# Patient Record
Sex: MALE | Race: WHITE | NOT HISPANIC OR LATINO | Employment: FULL TIME | URBAN - NONMETROPOLITAN AREA
[De-identification: names, ages, dates, MRNs, and addresses within clinical notes are randomized per-mention and may not be internally consistent; named-entity substitution may affect disease eponyms.]

---

## 2024-04-22 ENCOUNTER — OFFICE VISIT (OUTPATIENT)
Dept: FAMILY MEDICINE CLINIC | Facility: CLINIC | Age: 49
End: 2024-04-22
Payer: COMMERCIAL

## 2024-04-22 VITALS
HEART RATE: 84 BPM | TEMPERATURE: 97.8 F | OXYGEN SATURATION: 94 % | HEIGHT: 71 IN | WEIGHT: 220 LBS | BODY MASS INDEX: 30.8 KG/M2 | DIASTOLIC BLOOD PRESSURE: 72 MMHG | SYSTOLIC BLOOD PRESSURE: 112 MMHG

## 2024-04-22 DIAGNOSIS — A04.5 CAMPYLOBACTER ENTERITIS: Primary | ICD-10-CM

## 2024-04-22 PROCEDURE — 99204 OFFICE O/P NEW MOD 45 MIN: CPT | Performed by: PHYSICIAN ASSISTANT

## 2024-04-22 RX ORDER — AZITHROMYCIN 250 MG/1
TABLET, FILM COATED ORAL
Qty: 6 TABLET | Refills: 0 | Status: SHIPPED | OUTPATIENT
Start: 2024-04-22

## 2024-04-22 RX ORDER — MULTIPLE VITAMINS W/ MINERALS TAB 9MG-400MCG
1 TAB ORAL DAILY
COMMUNITY

## 2024-04-22 NOTE — PROGRESS NOTES
Follow Up Office Visit      Date: 2024   Patient Name: Malcolm Tellez  : 1975   MRN: [id number]     Chief Complaint:    Chief Complaint   Patient presents with    Hospital Follow Up Visit     Food poisoning       History of Present Illness: Malcolm Tellez is a 49 y.o. male who is here today for follow-up of an emergency room visit.  He reports that he developed sudden onset of abdominal cramping and diarrhea associated with general malaise this was seen in the emergency room last Tuesday.   After extensive labs he was diagnosed with  Campylobacter enteritis he was discharged home after 2 L of fluids but without antibiotics. At the 5 day francine  he continued to have abdominal pain and diarrhea now associated with shaking chills.  He reports that he has not been able to work the entire week due to illness and feeling of generalized malaise as well as dizziness.  He denies bloody stools but has had frequent attacks of abdominal cramping after eating.  His laboratory date shows some mildly increased liver enzymes but negative Hepatitis Panel.  His white count was Ten thousand.    Subjective      Review of Systems:   Review of Systems   Constitutional:  Positive for activity change, appetite change, chills, diaphoresis, fatigue, fever and unexpected weight loss.   Respiratory: Negative.     Cardiovascular: Negative.    Gastrointestinal:  Positive for diarrhea, nausea and indigestion. Negative for blood in stool and vomiting.   Genitourinary: Negative.        I have reviewed the patients family history, social history, past medical history, past surgical history and have updated it as appropriate.     Medications:     Current Outpatient Medications:     multivitamin with minerals tablet tablet, Take 1 tablet by mouth Daily., Disp: , Rfl:     azithromycin (Zithromax Z-Yair) 250 MG tablet, Take 2 tablets by mouth on day 1, then 1 tablet daily on days 2-5, Disp: 6 tablet, Rfl: 0    Allergies:   No Known  "Allergies    Objective     Physical Exam: Please see above  Vital Signs:   Vitals:    04/22/24 1123   BP: 112/72   Pulse: 84   Temp: 97.8 °F (36.6 °C)   SpO2: 94%   Weight: 99.8 kg (220 lb)   Height: 180 cm (70.87\")     Body mass index is 30.8 kg/m².  Facility age limit for growth %joanie is 20 years.  BMI is >= 30 and <35. (Class 1 Obesity). The following options were offered after discussion;: information on healthy weight added to patient's after visit summary        Physical Exam  Vitals and nursing note reviewed.   Constitutional:       General: He is not in acute distress.     Appearance: Normal appearance. He is not toxic-appearing or diaphoretic.   Eyes:      Extraocular Movements: Extraocular movements intact.      Pupils: Pupils are equal, round, and reactive to light.   Cardiovascular:      Rate and Rhythm: Normal rate and regular rhythm.      Heart sounds: No murmur heard.     No friction rub. No gallop.   Abdominal:      General: There is no distension.      Palpations: Abdomen is soft.      Comments: Multiple episodes of audible abdominal cramping   Neurological:      Mental Status: He is alert.       Procedures    Assessment / Plan      Assessment/Plan:   1. Campylobacter enteritis  We will treat and follow closely.  If he has fever over 100 or other episodes of shaking chills he should contact us.  RTC in 48 hours for reevalaution and to recheck liver enzymes.  - azithromycin (Zithromax Z-Yair) 250 MG tablet; Take 2 tablets by mouth on day 1, then 1 tablet daily on days 2-5  Dispense: 6 tablet; Refill: 0      Follow Up:   No follow-ups on file.      At [facility], we believe that sharing information builds trust and better relationships. You are receiving this note because you recently visited [facility]. It is possible you will see health information before a provider has talked with you about it. This kind of information can be easy to misunderstand. To help you fully understand what it means " for your health, we urge you to discuss this note with your provider.    Rere Burroughs PA-C  [facility]

## 2024-04-24 ENCOUNTER — LAB (OUTPATIENT)
Dept: FAMILY MEDICINE CLINIC | Facility: CLINIC | Age: 49
End: 2024-04-24
Payer: COMMERCIAL

## 2024-04-24 ENCOUNTER — OFFICE VISIT (OUTPATIENT)
Dept: FAMILY MEDICINE CLINIC | Facility: CLINIC | Age: 49
End: 2024-04-24
Payer: COMMERCIAL

## 2024-04-24 VITALS
WEIGHT: 225.6 LBS | BODY MASS INDEX: 31.58 KG/M2 | TEMPERATURE: 97.5 F | HEART RATE: 82 BPM | HEIGHT: 71 IN | RESPIRATION RATE: 16 BRPM | OXYGEN SATURATION: 98 % | SYSTOLIC BLOOD PRESSURE: 102 MMHG | DIASTOLIC BLOOD PRESSURE: 74 MMHG

## 2024-04-24 DIAGNOSIS — R06.83 LOUD SNORING: ICD-10-CM

## 2024-04-24 DIAGNOSIS — Z00.00 ANNUAL PHYSICAL EXAM: ICD-10-CM

## 2024-04-24 DIAGNOSIS — R74.8 ELEVATED LIVER ENZYMES: ICD-10-CM

## 2024-04-24 DIAGNOSIS — K52.9 ENTERITIS: Primary | ICD-10-CM

## 2024-04-24 LAB
ALBUMIN SERPL-MCNC: 3.3 G/DL (ref 3.5–5.2)
ALBUMIN/GLOB SERPL: 1.2 G/DL
ALP SERPL-CCNC: 87 U/L (ref 39–117)
ALT SERPL W P-5'-P-CCNC: 99 U/L (ref 1–41)
ANION GAP SERPL CALCULATED.3IONS-SCNC: 8.8 MMOL/L (ref 5–15)
AST SERPL-CCNC: 56 U/L (ref 1–40)
BILIRUB SERPL-MCNC: 0.2 MG/DL (ref 0–1.2)
BUN SERPL-MCNC: 11 MG/DL (ref 6–20)
BUN/CREAT SERPL: 10.1 (ref 7–25)
CALCIUM SPEC-SCNC: 8.5 MG/DL (ref 8.6–10.5)
CHLORIDE SERPL-SCNC: 105 MMOL/L (ref 98–107)
CHOLEST SERPL-MCNC: 143 MG/DL (ref 0–200)
CO2 SERPL-SCNC: 25.2 MMOL/L (ref 22–29)
CREAT SERPL-MCNC: 1.09 MG/DL (ref 0.76–1.27)
DEPRECATED RDW RBC AUTO: 39.5 FL (ref 37–54)
EGFRCR SERPLBLD CKD-EPI 2021: 84.2 ML/MIN/1.73
ERYTHROCYTE [DISTWIDTH] IN BLOOD BY AUTOMATED COUNT: 12.5 % (ref 12.3–15.4)
GLOBULIN UR ELPH-MCNC: 2.7 GM/DL
GLUCOSE SERPL-MCNC: 88 MG/DL (ref 65–99)
HCT VFR BLD AUTO: 43.1 % (ref 37.5–51)
HDLC SERPL-MCNC: 18 MG/DL (ref 40–60)
HGB BLD-MCNC: 14.7 G/DL (ref 13–17.7)
LDLC SERPL CALC-MCNC: 93 MG/DL (ref 0–100)
LDLC/HDLC SERPL: 4.92 {RATIO}
MCH RBC QN AUTO: 29.8 PG (ref 26.6–33)
MCHC RBC AUTO-ENTMCNC: 34.1 G/DL (ref 31.5–35.7)
MCV RBC AUTO: 87.4 FL (ref 79–97)
PLATELET # BLD AUTO: 284 10*3/MM3 (ref 140–450)
PMV BLD AUTO: 9.9 FL (ref 6–12)
POTASSIUM SERPL-SCNC: 4.4 MMOL/L (ref 3.5–5.2)
PROT SERPL-MCNC: 6 G/DL (ref 6–8.5)
RBC # BLD AUTO: 4.93 10*6/MM3 (ref 4.14–5.8)
SODIUM SERPL-SCNC: 139 MMOL/L (ref 136–145)
TESTOST SERPL-MCNC: 770 NG/DL (ref 249–836)
TRIGL SERPL-MCNC: 182 MG/DL (ref 0–150)
TSH SERPL DL<=0.05 MIU/L-ACNC: 1.51 UIU/ML (ref 0.27–4.2)
VLDLC SERPL-MCNC: 32 MG/DL (ref 5–40)
WBC NRBC COR # BLD AUTO: 4.99 10*3/MM3 (ref 3.4–10.8)

## 2024-04-24 PROCEDURE — 36415 COLL VENOUS BLD VENIPUNCTURE: CPT | Performed by: FAMILY MEDICINE

## 2024-04-24 PROCEDURE — 86803 HEPATITIS C AB TEST: CPT | Performed by: PHYSICIAN ASSISTANT

## 2024-04-24 PROCEDURE — 84403 ASSAY OF TOTAL TESTOSTERONE: CPT | Performed by: PHYSICIAN ASSISTANT

## 2024-04-24 PROCEDURE — 83036 HEMOGLOBIN GLYCOSYLATED A1C: CPT | Performed by: PHYSICIAN ASSISTANT

## 2024-04-24 PROCEDURE — 80050 GENERAL HEALTH PANEL: CPT | Performed by: PHYSICIAN ASSISTANT

## 2024-04-24 PROCEDURE — 99214 OFFICE O/P EST MOD 30 MIN: CPT | Performed by: PHYSICIAN ASSISTANT

## 2024-04-24 PROCEDURE — 80061 LIPID PANEL: CPT | Performed by: PHYSICIAN ASSISTANT

## 2024-04-24 PROCEDURE — 85007 BL SMEAR W/DIFF WBC COUNT: CPT | Performed by: PHYSICIAN ASSISTANT

## 2024-04-24 NOTE — PROGRESS NOTES
"    Follow Up Office Visit      Date: 2024   Patient Name: Malcolm Tellez  : 1975   MRN: [id number]     Chief Complaint:    Chief Complaint   Patient presents with    Follow-up     2 day.       History of Present Illness: Malcolm Tellez is a 49 y.o. male who is here today for 48 hour follow up after 10 days of GI distress diagnosed a camphylobacter enteritis.we elected to begin zithromax 48 hours ago and he is now beginning to feel improvement.  His gut is tolerating food without cramping and his energy level is improving.  He has been pushing Gatorade as advised.  While he is here and we are repeating his liver enzymes that were mildly elevated he would like to pursue checking his testosterone level .  He has noticed decreased sex drive, generalized lack of energy and muscle wasting.  He admits to snoring and frequently waking from sleep throughout the night.  His wife frequently states his snoring is loudly and waking unrefreshed.    Subjective      Review of Systems:   Review of Systems   Constitutional: Negative.    HENT: Negative.     Respiratory: Negative.     Cardiovascular: Negative.    Psychiatric/Behavioral: Negative.       I have reviewed the patients family history, social history, past medical history, past surgical history and have updated it as appropriate.     Medications:     Current Outpatient Medications:     azithromycin (Zithromax Z-Yair) 250 MG tablet, Take 2 tablets by mouth on day 1, then 1 tablet daily on days 2-5, Disp: 6 tablet, Rfl: 0    multivitamin with minerals tablet tablet, Take 1 tablet by mouth Daily., Disp: , Rfl:     Allergies:   No Known Allergies    Objective     Physical Exam: Please see above  Vital Signs:   Vitals:    24 1052   BP: 102/74   BP Location: Left arm   Patient Position: Sitting   Cuff Size: Adult   Pulse: 82   Resp: 16   Temp: 97.5 °F (36.4 °C)   TempSrc: Temporal   SpO2: 98%   Weight: 102 kg (225 lb 9.6 oz)   Height: 180.3 cm (71\")     Body mass " index is 31.46 kg/m².  Facility age limit for growth %joanie is 20 years.          Physical Exam  Vitals and nursing note reviewed.   Constitutional:       General: He is not in acute distress.     Appearance: Normal appearance. He is not ill-appearing or toxic-appearing.   HENT:      Mouth/Throat:      Mouth: Mucous membranes are moist.   Eyes:      Extraocular Movements: Extraocular movements intact.      Pupils: Pupils are equal, round, and reactive to light.   Cardiovascular:      Rate and Rhythm: Normal rate and regular rhythm.      Heart sounds: No murmur heard.     No friction rub. No gallop.   Pulmonary:      Effort: Pulmonary effort is normal. No respiratory distress.      Breath sounds: Normal breath sounds. No wheezing or rales.   Neurological:      General: No focal deficit present.      Mental Status: He is alert and oriented to person, place, and time. Mental status is at baseline.      Cranial Nerves: No cranial nerve deficit.      Motor: No weakness.      Gait: Gait normal.   Psychiatric:         Mood and Affect: Mood normal.         Behavior: Behavior normal.         Thought Content: Thought content normal.         Judgment: Judgment normal.     Procedures    Assessment / Plan      Assessment/Plan:   1. Enteritis  Resolving, he will push fluids, slowly reintroduce food and begin probiotic    2. Elevated liver enzymes  We will repeat today and follow.    3. Loud snoring  We have discussed the long term issues including cardiovascular disease associated with snoring.  We will refer to sleep medicine  - Testosterone; Future  - Ambulatory Referral to Sleep Medicine    4. Annual physical exam    - Comprehensive Metabolic Panel; Future  - CBC With Manual Differential; Future  - Hemoglobin A1c; Future  - TSH Rfx On Abnormal To Free T4; Future  - Hepatitis C Antibody; Future  - Lipid Panel; Future      Follow Up:   No follow-ups on file.      At [facility], we believe that sharing information builds trust  and better relationships. You are receiving this note because you recently visited [facility]. It is possible you will see health information before a provider has talked with you about it. This kind of information can be easy to misunderstand. To help you fully understand what it means for your health, we urge you to discuss this note with your provider.    Rere Burroughs PA-C  [facility]

## 2024-04-25 LAB
ANISOCYTOSIS BLD QL: NORMAL
BASOPHILS # BLD MANUAL: 0.05 10*3/MM3 (ref 0–0.2)
BASOPHILS NFR BLD MANUAL: 1 % (ref 0–1.5)
EOSINOPHIL # BLD MANUAL: 0.05 10*3/MM3 (ref 0–0.4)
EOSINOPHIL NFR BLD MANUAL: 1 % (ref 0.3–6.2)
HBA1C MFR BLD: 5.5 % (ref 4.8–5.6)
HCV AB SER QL: NORMAL
LYMPHOCYTES # BLD MANUAL: 1.55 10*3/MM3 (ref 0.7–3.1)
LYMPHOCYTES NFR BLD MANUAL: 11 % (ref 5–12)
MONOCYTES # BLD: 0.55 10*3/MM3 (ref 0.1–0.9)
NEUTROPHILS # BLD AUTO: 2.79 10*3/MM3 (ref 1.7–7)
NEUTROPHILS NFR BLD MANUAL: 56 % (ref 42.7–76)
PLAT MORPH BLD: NORMAL
POIKILOCYTOSIS BLD QL SMEAR: NORMAL
VARIANT LYMPHS NFR BLD MANUAL: 27 % (ref 19.6–45.3)
VARIANT LYMPHS NFR BLD MANUAL: 4 % (ref 0–5)
WBC MORPH BLD: NORMAL

## 2025-03-11 ENCOUNTER — OFFICE VISIT (OUTPATIENT)
Dept: FAMILY MEDICINE CLINIC | Facility: CLINIC | Age: 50
End: 2025-03-11
Payer: COMMERCIAL

## 2025-03-11 VITALS
BODY MASS INDEX: 31.22 KG/M2 | DIASTOLIC BLOOD PRESSURE: 76 MMHG | HEIGHT: 71 IN | SYSTOLIC BLOOD PRESSURE: 118 MMHG | WEIGHT: 223 LBS | TEMPERATURE: 98 F | OXYGEN SATURATION: 97 % | HEART RATE: 68 BPM | RESPIRATION RATE: 18 BRPM

## 2025-03-11 DIAGNOSIS — R74.8 ELEVATED LIVER ENZYMES: ICD-10-CM

## 2025-03-11 DIAGNOSIS — Z12.11 COLON CANCER SCREENING: ICD-10-CM

## 2025-03-11 DIAGNOSIS — M54.50 ACUTE BILATERAL LOW BACK PAIN WITHOUT SCIATICA: ICD-10-CM

## 2025-03-11 DIAGNOSIS — Z12.5 PROSTATE CANCER SCREENING: ICD-10-CM

## 2025-03-11 DIAGNOSIS — F17.210 CIGARETTE NICOTINE DEPENDENCE WITHOUT COMPLICATION: ICD-10-CM

## 2025-03-11 DIAGNOSIS — Z28.21 IMMUNIZATION DECLINED: ICD-10-CM

## 2025-03-11 DIAGNOSIS — Z00.00 WELL ADULT EXAM: Primary | ICD-10-CM

## 2025-03-11 RX ORDER — METHYLPREDNISOLONE 4 MG/1
TABLET ORAL
COMMUNITY
Start: 2025-03-09 | End: 2025-03-11

## 2025-03-11 RX ORDER — METHOCARBAMOL 500 MG/1
500 TABLET, FILM COATED ORAL EVERY 12 HOURS PRN
COMMUNITY
Start: 2025-03-09 | End: 2025-03-11

## 2025-03-11 RX ORDER — DICLOFENAC SODIUM 75 MG/1
75 TABLET, DELAYED RELEASE ORAL 3 TIMES DAILY
Qty: 90 TABLET | Refills: 0 | Status: SHIPPED | OUTPATIENT
Start: 2025-03-11

## 2025-03-11 RX ORDER — METAXALONE 800 MG/1
800 TABLET ORAL 3 TIMES DAILY PRN
Qty: 90 TABLET | Refills: 0 | Status: SHIPPED | OUTPATIENT
Start: 2025-03-11

## 2025-03-11 NOTE — PROGRESS NOTES
Male Physical Note      Date: 2025   Patient Name: Malcolm Tellez  : 1975   MRN: [id number]     Chief Complaint:    Chief Complaint   Patient presents with   • Back Pain       History of Present Illness: Malcolm Tellez is a 50 y.o. male who is here today for their annual health maintenance and physical.  Patient also returns to clinic with complaint of back pain after he got struck by a tree limb.    History of Present Illness  The patient is a 50-year-old male who presents for evaluation of back pain.    He sustained an injury from a falling tree limb on Saturday morning, which resulted in him seeking medical attention at a clinic the following day. The impact was significant enough to cause him to fall, but he did not sustain any direct back injuries. He reports experiencing a sensation of popping in his back during the fall. His current symptoms include central back pain, without any radiating leg pain. He has been managing his pain with Tylenol, which he finds effective, and a heating pad. He also reports difficulty sleeping due to the pain and has noticed a popping sensation in his lower back during stretching exercises. He expresses concern about potential side effects of steroid treatment. He reports experiencing pain on the sides of his body when sitting, which is alleviated by stretching. He has not had a bowel movement since Friday and reports a decreased appetite. He also reports experiencing numbness in his arm when sitting, which he attributes to maintaining pressure on it. He is not experiencing any symptoms of anxiety or depression. He was prescribed steroids and a muscle relaxer, but found the latter to be ineffective. He has been using a heating pad for relief and has been sleeping in a recliner with a pillow for support.    He is still smoking. He has not had colon cancer screening done.    SOCIAL HISTORY  He admits to smoking.    MEDICATIONS  Current: Tylenol,  prednisone  Discontinued: methocarbamol    IMMUNIZATIONS  He is up to date with his tetanus vaccine until 2030.     Patient appears to be doing otherwise well.  They have continue with their medications without any side effects.  They have not had any changes in their usual activity, appetite and sleep.  Patient denies any other cardiovascular, respiratory, gastrointestinal, urologic or neurologic complaints.    Subjective      Review of Systems:   Review of Systems   Constitutional:  Negative for activity change, appetite change and fatigue.   Respiratory:  Negative for cough, chest tightness, shortness of breath and wheezing.    Cardiovascular:  Negative for chest pain, palpitations and leg swelling.   Gastrointestinal:  Negative for abdominal distention, abdominal pain, blood in stool, constipation, diarrhea, nausea, vomiting, GERD and indigestion.   Genitourinary:  Negative for difficulty urinating, dysuria, flank pain, frequency, hematuria and urgency.   Musculoskeletal:  Positive for back pain. Negative for arthralgias, gait problem, joint swelling and myalgias.   Neurological:  Negative for dizziness, tremors, seizures, syncope, weakness, light-headedness, numbness, headache and memory problem.   Psychiatric/Behavioral:  Negative for sleep disturbance and depressed mood. The patient is not nervous/anxious.        Past Medical History, Social History, Family History and Care Team were all reviewed with patient and updated as appropriate.     Medications:     Current Outpatient Medications:   •  diclofenac (VOLTAREN) 75 MG EC tablet, Take 1 tablet by mouth 3 (Three) Times a Day., Disp: 90 tablet, Rfl: 0  •  metaxalone (Skelaxin) 800 MG tablet, Take 1 tablet by mouth 3 (Three) Times a Day As Needed for Muscle Spasms., Disp: 90 tablet, Rfl: 0    Allergies:   No Known Allergies    Immunizations:  Health Maintenance Summary            Current Care Gaps       COLORECTAL CANCER SCREENING (View Topic Details) Never  "done     No completion, postpone, or frequency change history exists for this topic.                      Upcoming       INFLUENZA VACCINE (Yearly - July to March) Postponed until 3/31/2025      03/11/2025  Postponed until 3/31/2025 by John Paul Burroughs MD (Patient Refused)              COVID-19 Vaccine (1 - 2024-25 season) Postponed until 3/11/2026      03/11/2025  Postponed until 3/11/2026 by John Paul Burroughs MD (Patient Refused)    04/22/2024  Postponed until 4/22/2025 by Jena Husain MA (Patient Refused)              Pneumococcal Vaccine 0-49 (1 of 2 - PCV) Postponed until 3/11/2026      03/11/2025  Postponed until 3/11/2026 by John Paul Burroughs MD (Patient Refused)              BMI FOLLOWUP (Yearly) Next due on 4/22/2025 04/22/2024  [employer]: WORKFLOW - QUALITY MEASUREMENT - DOCUMENTED WEIGHT FOLLOW-UP PLAN              ANNUAL PHYSICAL (Yearly) Next due on 3/11/2026      03/11/2025  Done              TDAP/TD VACCINES (2 - Td or Tdap) Next due on 2/25/2030 02/25/2020  Imm Admin: Tdap                      Completed or No Longer Recommended       HEPATITIS C SCREENING  Completed      04/24/2024  Hepatitis C Antibody                            No orders of the defined types were placed in this encounter.      Colorectal Screening:   Ordered.  Last Completed Colonoscopy    This patient has no relevant Health Maintenance data.       CT for Smoker (Age 50-80, 20pk yr within last 15 years): Deferred until age 50.  Bone Density/DEXA (high risk): Deferred  Hep C (Age 18-79 once): Previously ordered.  HIV (Age 15-65 once) : Deferred.  PSA (Over age 50, C Level Recommendation): Ordered.  US Aorta (For male smokers, age 65): Deferred  A1c:   Hemoglobin A1C   Date Value Ref Range Status   04/24/2024 5.50 4.80 - 5.60 % Final     Lipid panel: No results found for: \"LABLIPI\"    The ASCVD Risk score (Julian DK, et al., 2019) failed to calculate for the following reasons:    The valid HDL " "cholesterol range is 20 to 100 mg/dL    Dermatology: Advised if necessary.  Ophthalmologist: Advised.  Dentist: Advised.    Tobacco Use: High Risk (3/11/2025)    Patient History    • Smoking Tobacco Use: Some Days    • Smokeless Tobacco Use: Former    • Passive Exposure: Current       Social History     Substance and Sexual Activity   Alcohol Use Never        Social History     Substance and Sexual Activity   Drug Use Never        Diet/Physical activity: Well-balanced diet/daily exercise.    Sexual health: No issues at present time.    PHQ-2 Depression Screening  PHQ-9 Total Score:  0    Measures:   Advanced Care Planning:   Patient does not have an advance directive, information provided.    Smoking Cessation:   less than 3 minutes spent counseling Has reduced [place] use     Objective     Physical Exam:  Vital Signs:   Vitals:    03/11/25 1035   BP: 118/76   BP Location: Left arm   Patient Position: Sitting   Cuff Size: Adult   Pulse: 68   Resp: 18   Temp: 98 °F (36.7 °C)   TempSrc: Temporal   SpO2: 97%   Weight: 101 kg (223 lb)   Height: 180.3 cm (70.98\")     Body mass index is 31.12 kg/m².     Physical Exam  Vitals and nursing note reviewed.   Constitutional:       Appearance: Normal appearance.   HENT:      Head: Normocephalic and atraumatic.      Nose: Nose normal.      Mouth/Throat:      Pharynx: Oropharynx is clear.   Eyes:      Extraocular Movements: Extraocular movements intact.      Pupils: Pupils are equal, round, and reactive to light.   Neck:      Thyroid: No thyroid mass or thyromegaly.      Trachea: Trachea normal.   Cardiovascular:      Rate and Rhythm: Normal rate and regular rhythm.      Pulses: Normal pulses. No decreased pulses.      Heart sounds: Normal heart sounds.   Pulmonary:      Effort: Pulmonary effort is normal.      Breath sounds: Normal breath sounds.   Abdominal:      General: Abdomen is flat. Bowel sounds are normal.      Palpations: Abdomen is soft.      Tenderness: There is no " abdominal tenderness.   Musculoskeletal:      Cervical back: Normal and neck supple.      Thoracic back: Normal.      Lumbar back: Spasms and tenderness present. No bony tenderness. Decreased range of motion.      Right lower leg: No edema.      Left lower leg: No edema.   Lymphadenopathy:      Cervical: No cervical adenopathy.   Skin:     General: Skin is warm and dry.   Neurological:      General: No focal deficit present.      Mental Status: He is alert and oriented to person, place, and time.      Sensory: Sensation is intact.      Motor: Motor function is intact.      Coordination: Coordination is intact.   Psychiatric:         Attention and Perception: Attention normal.         Mood and Affect: Mood normal.         Speech: Speech normal.         Behavior: Behavior normal.          POCT Results (if applicable):   Results for orders placed or performed in visit on 04/24/24   Comprehensive Metabolic Panel    Collection Time: 04/24/24 11:48 AM    Specimen: Arm, Right; Blood   Result Value Ref Range    Glucose 88 65 - 99 mg/dL    BUN 11 6 - 20 mg/dL    Creatinine 1.09 0.76 - 1.27 mg/dL    Sodium 139 136 - 145 mmol/L    Potassium 4.4 3.5 - 5.2 mmol/L    Chloride 105 98 - 107 mmol/L    CO2 25.2 22.0 - 29.0 mmol/L    Calcium 8.5 (L) 8.6 - 10.5 mg/dL    Total Protein 6.0 6.0 - 8.5 g/dL    Albumin 3.3 (L) 3.5 - 5.2 g/dL    ALT (SGPT) 99 (H) 1 - 41 U/L    AST (SGOT) 56 (H) 1 - 40 U/L    Alkaline Phosphatase 87 39 - 117 U/L    Total Bilirubin 0.2 0.0 - 1.2 mg/dL    Globulin 2.7 gm/dL    A/G Ratio 1.2 g/dL    BUN/Creatinine Ratio 10.1 7.0 - 25.0    Anion Gap 8.8 5.0 - 15.0 mmol/L    eGFR 84.2 >60.0 mL/min/1.73   Hemoglobin A1c    Collection Time: 04/24/24 11:48 AM    Specimen: Arm, Right; Blood   Result Value Ref Range    Hemoglobin A1C 5.50 4.80 - 5.60 %   TSH Rfx On Abnormal To Free T4    Collection Time: 04/24/24 11:48 AM    Specimen: Arm, Right; Blood   Result Value Ref Range    TSH 1.510 0.270 - 4.200 uIU/mL    Hepatitis C Antibody    Collection Time: 04/24/24 11:48 AM    Specimen: Arm, Right; Blood   Result Value Ref Range    Hepatitis C Ab Non-Reactive Non-Reactive   Lipid Panel    Collection Time: 04/24/24 11:48 AM    Specimen: Arm, Right; Blood   Result Value Ref Range    Total Cholesterol 143 0 - 200 mg/dL    Triglycerides 182 (H) 0 - 150 mg/dL    HDL Cholesterol 18 (L) 40 - 60 mg/dL    LDL Cholesterol  93 0 - 100 mg/dL    VLDL Cholesterol 32 5 - 40 mg/dL    LDL/HDL Ratio 4.92    Testosterone    Collection Time: 04/24/24 11:48 AM    Specimen: Arm, Right; Blood   Result Value Ref Range    Testosterone, Total 770.00 249.00 - 836.00 ng/dL   CBC Auto Differential    Collection Time: 04/24/24 11:48 AM    Specimen: Arm, Right; Blood   Result Value Ref Range    WBC 4.99 3.40 - 10.80 10*3/mm3    RBC 4.93 4.14 - 5.80 10*6/mm3    Hemoglobin 14.7 13.0 - 17.7 g/dL    Hematocrit 43.1 37.5 - 51.0 %    MCV 87.4 79.0 - 97.0 fL    MCH 29.8 26.6 - 33.0 pg    MCHC 34.1 31.5 - 35.7 g/dL    RDW 12.5 12.3 - 15.4 %    RDW-SD 39.5 37.0 - 54.0 fl    MPV 9.9 6.0 - 12.0 fL    Platelets 284 140 - 450 10*3/mm3   Manual Differential    Collection Time: 04/24/24 11:48 AM    Specimen: Arm, Right; Blood   Result Value Ref Range    Neutrophil % 56.0 42.7 - 76.0 %    Lymphocyte % 27.0 19.6 - 45.3 %    Monocyte % 11.0 5.0 - 12.0 %    Eosinophil % 1.0 0.3 - 6.2 %    Basophil % 1.0 0.0 - 1.5 %    Atypical Lymphocyte % 4.0 0.0 - 5.0 %    Neutrophils Absolute 2.79 1.70 - 7.00 10*3/mm3    Lymphocytes Absolute 1.55 0.70 - 3.10 10*3/mm3    Monocytes Absolute 0.55 0.10 - 0.90 10*3/mm3    Eosinophils Absolute 0.05 0.00 - 0.40 10*3/mm3    Basophils Absolute 0.05 0.00 - 0.20 10*3/mm3    Anisocytosis Slight/1+ None Seen    Poikilocytes Slight/1+ None Seen    WBC Morphology Normal Normal    Platelet Morphology Normal Normal       Procedures    Assessment / Plan      Assessment/Plan:   Diagnoses and all orders for this visit:    1. Well adult exam  (Primary)  Patient did have a wellness exam performed today that did not reveal any abnormality.  Patient's anxiety/depression scores were reviewed.  We will continue to monitor laboratory data as well as symptomatology and if there are any other questions or concerns prior to the next scheduled follow-up they will contact us.  Patient will receive his blood test April 24, 2025.  -     CBC (No Diff); Future  -     Comprehensive Metabolic Panel; Future  -     Hemoglobin A1c; Future  -     Lipid Panel; Future  -     GARIBAY Fibrosure Plus; Future  -     Vitamin B12; Future  -     TSH Rfx On Abnormal To Free T4; Future  -     Urinalysis without microscopic (no culture) - Urine, Clean Catch; Future    2. Elevated liver enzymes   Patient does have history of elevated liver function test.  We will repeat his liver function test and will make adjustments based on these findings.    3. Acute bilateral low back pain without sciatica  Patient does appear to have some spasms of his paraspinal muscles.  He is not having radiculopathy at present time.  We will continue with the steroids and will initiate diclofenac after he finishes.  We will try metaxalone as he did not do well with the methocarbamol.  He will use Tylenol for pain relief and we will continue to monitor.  If he does develop bowel/bladder incontinence or any radiculopathy he will contact his immediately for reevaluation.  -     metaxalone (Skelaxin) 800 MG tablet; Take 1 tablet by mouth 3 (Three) Times a Day As Needed for Muscle Spasms.  Dispense: 90 tablet; Refill: 0  -     diclofenac (VOLTAREN) 75 MG EC tablet; Take 1 tablet by mouth 3 (Three) Times a Day.  Dispense: 90 tablet; Refill: 0    4. Colon cancer screening  -     Ambulatory Referral to General Surgery    5. Cigarette nicotine dependence without complication   Patient does continue to smoke.  He is trying to reduce his tobacco usage.  We will provide medical assistance if necessary.  We will start   screening for pulmonary nodules when he turns 52.    6. Immunization declined   Patient would benefit from having immunizations given.  Patient has elected not to receive the recommended vaccines at present time.  They understand the risk of not receiving these vaccine and the disease in which they may incur.  We have discussed other options and will pursue with encouragement of compliance with future appointments.  If patient does change their minds prior to the next scheduled follow-up, they may contact us and we will provide immunization at that time.    7. Prostate cancer screening  -     PSA Screen; Future         Healthcare Maintenance:  Counseling provided based on age appropriate USPSTF guidelines.       Malcolm Tellez voices understanding and acceptance of this advice and will call back with any further questions or concerns. AVS with preventive healthcare tips printed for patient.     Follow Up:   Return if symptoms worsen or fail to improve.    At [facility], we believe that sharing information builds trust and better relationships. You are receiving this note because you recently visited [facility]. It is possible you will see health information before a provider has talked with you about it. This kind of information can be easy to misunderstand. To help you fully understand what it means for your health, we urge you to discuss this note with your provider.    John Paul Burroughs MD  [facility]  Answers submitted by the patient for this visit:  Back Pain Questionnaire (Submitted on 3/11/2025)  Chief Complaint: Back pain  Chronicity: new  Onset: in the past 7 days  Frequency: constantly  Progression since onset: unchanged  Pain location: lumbar spine  Pain quality: aching, burning, shooting, stabbing     No

## 2025-05-12 ENCOUNTER — TELEPHONE (OUTPATIENT)
Dept: FAMILY MEDICINE CLINIC | Facility: CLINIC | Age: 50
End: 2025-05-12
Payer: COMMERCIAL

## 2025-06-11 ENCOUNTER — TELEPHONE (OUTPATIENT)
Dept: FAMILY MEDICINE CLINIC | Facility: CLINIC | Age: 50
End: 2025-06-11
Payer: COMMERCIAL

## 2025-06-11 NOTE — TELEPHONE ENCOUNTER
Left message that  labs previously ordered has not yet been completed.  Asked the patient to call back if the office could help get the test completed, or to contact us if, for any reason, unable to complete your tests within the next two weeks.     Relay

## 2025-07-11 ENCOUNTER — TELEPHONE (OUTPATIENT)
Dept: FAMILY MEDICINE CLINIC | Facility: CLINIC | Age: 50
End: 2025-07-11
Payer: COMMERCIAL

## 2025-07-16 ENCOUNTER — TELEPHONE (OUTPATIENT)
Dept: FAMILY MEDICINE CLINIC | Facility: CLINIC | Age: 50
End: 2025-07-16
Payer: COMMERCIAL

## 2025-07-16 NOTE — TELEPHONE ENCOUNTER
Relay     Detailed message left for patient, for patient following up on current referral appointment status. Request to have patient contact pcp/referral office to schedule consultation visit, if interested in completing current referral